# Patient Record
Sex: FEMALE | Race: WHITE
[De-identification: names, ages, dates, MRNs, and addresses within clinical notes are randomized per-mention and may not be internally consistent; named-entity substitution may affect disease eponyms.]

---

## 2019-04-04 ENCOUNTER — HOSPITAL ENCOUNTER (OUTPATIENT)
Dept: HOSPITAL 91 - GIL | Age: 64
Discharge: HOME | End: 2019-04-04
Payer: COMMERCIAL

## 2019-04-04 ENCOUNTER — HOSPITAL ENCOUNTER (OUTPATIENT)
Dept: HOSPITAL 10 - GIL | Age: 64
Discharge: HOME | End: 2019-04-04
Attending: INTERNAL MEDICINE
Payer: COMMERCIAL

## 2019-04-04 VITALS
BODY MASS INDEX: 34.93 KG/M2 | HEIGHT: 60 IN | HEIGHT: 60 IN | WEIGHT: 177.91 LBS | BODY MASS INDEX: 34.93 KG/M2 | WEIGHT: 177.91 LBS

## 2019-04-04 VITALS — DIASTOLIC BLOOD PRESSURE: 72 MMHG | RESPIRATION RATE: 13 BRPM | HEART RATE: 57 BPM | SYSTOLIC BLOOD PRESSURE: 154 MMHG

## 2019-04-04 VITALS — SYSTOLIC BLOOD PRESSURE: 142 MMHG | DIASTOLIC BLOOD PRESSURE: 71 MMHG | RESPIRATION RATE: 18 BRPM

## 2019-04-04 DIAGNOSIS — K20.0: ICD-10-CM

## 2019-04-04 DIAGNOSIS — K44.9: Primary | ICD-10-CM

## 2019-04-04 PROCEDURE — 88305 TISSUE EXAM BY PATHOLOGIST: CPT

## 2019-04-04 PROCEDURE — 43239 EGD BIOPSY SINGLE/MULTIPLE: CPT

## 2019-04-04 PROCEDURE — 88312 SPECIAL STAINS GROUP 1: CPT

## 2019-04-04 PROCEDURE — 88313 SPECIAL STAINS GROUP 2: CPT

## 2019-04-04 NOTE — PREAC
Date/Time of Note


Date/Time of Note


DATE: 4/4/19 


TIME: 11:30





Anesthesia Eval and Record


Evaluation


Time Pre-Procedure Interview


DATE: 4/4/19 


TIME: 11:30


Age


64


Sex


female


NPO:  8 hrs


Preoperative diagnosis


abd pain


Planned procedure


EGD





Past Medical History


Past Medical History:  Includes


Cardio:  HTN, Dyslipidemia


Neuro:  Peripheral neuropathy


Musculoskeletal:  Osteoarthritis


GI:  GERD


Psych:  Depression, Anxiety





Surgery & Anesthesia Issues


No known issue





Meds


Anticoagulation:  No


Beta Blocker within 24 hr:  No


Reason Beta Blocker not given:  Pt. not on B-Blocker


Reported Medications


[Meloxicam]   No Conflict Check


   4/4/19


[Gabapentin]   No Conflict Check


   4/4/19


[Vitamin D3]   No Conflict Check


   4/4/19


[Atorvastatin]   No Conflict Check


   4/4/19


Aspirin* (Aspirin* Chew) 81 Mg Tab.chew, 81 MG PO DAILY, TAB.CHEW


   4/4/19


Ibuprofen* (Ibuprofen*) 600 Mg Tablet, 600 MG PO bib


   2/16/13


Buspirone Hcl* (Buspirone Hcl*) 10 Mg Tablet, 10 MG PO daily


   2/16/13


Lorazepam* (Ativan*) 1 Mg Tablet, 1 MG PO daily


   2/16/13


Losartan Potassium* (Cozaar*) 50 Mg Tablet, 50 MG PO daily


   2/16/13


Discontinued Reported Medications


Venlafaxine Hcl* (Effexor XR*) 150 Mg Cap.sr.24h, 150 MG PO daily


   2/16/13


[asa]   No Conflict Check, 81 MG BC daily


   2/16/13


Meds reviewed:  Yes





Allergies


Coded Allergies:  


     Penicillins (Unverified  Allergy, Unknown, 4/25/16)


Allergies Reviewed:  Yes





Labs/Studies


Labs Reviewed:  Reviewed by anesthesiologist


Pregnancy test:  Negative


Studies:  ECG, CXR





Pre-procedure Exam


Last vitals





Vital Signs


  Date      Temp  Pulse  Resp  B/P (MAP)   Pulse Ox  O2          O2 Flow    FiO2


Time                                                 Delivery    Rate


    4/4/19  98.0     57    13      154/72       100


     11:10                           (99)





Airway:  Adequate mouth opening, Adequate thyromental dist


Mallampati:  Mallampati III


Teeth:  Normal


Lung:  Normal


Heart:  Normal





ASA Physical Status


ASA physical status:  3


Emergency:  None





Planned Anesthetic


General/MAC:  MAC





Planned Pain Management


Parenteral pain med





Pre-operative Attestations


Prior to commencing anesthesia and surgery, the patient was re-evaluated, there 


was verification of:


*The patient's identity


*The results of appropriate recent lab work and preoperative vital signs


*The above evaluation not changing prior to induction


*Anesthetic plan, risk benefits, alternative and complications discussed with 


patient/family; questions answered; patient/family understands, accepts and wi


shes to proceed.











ARACELI MOTLEY MD                Apr 4, 2019 11:32

## 2019-04-04 NOTE — PAC
Date/Time of Note


Date/Time of Note


DATE: 4/4/19 


TIME: 16:35





Post-Anesthesia Notes


Post-Anesthesia Note


Last documented vital signs





Vital Signs


  Date      Temp  Pulse  Resp  B/P (MAP)   Pulse Ox  O2          O2 Flow    FiO2


Time                                                 Delivery    Rate


    4/4/19                 18      142/71        95


     12:23                           (94)


    4/4/19  98.0     57


     11:10





Activity:  WNL


Respiratory function:  WNL


Cardiovascular function:  WNL


Mental status:  Baseline


Pain reasonably controlled:  Yes


Hydration appropriate:  Yes


Nausea/Vomiting absent:  Yes











ARACELI MOTLEY MD                Apr 4, 2019 16:35